# Patient Record
Sex: FEMALE | Race: OTHER
[De-identification: names, ages, dates, MRNs, and addresses within clinical notes are randomized per-mention and may not be internally consistent; named-entity substitution may affect disease eponyms.]

---

## 2020-01-06 ENCOUNTER — TRANSCRIPTION ENCOUNTER (OUTPATIENT)
Age: 50
End: 2020-01-06

## 2020-01-07 PROBLEM — Z00.00 ENCOUNTER FOR PREVENTIVE HEALTH EXAMINATION: Status: ACTIVE | Noted: 2020-01-07

## 2020-01-13 ENCOUNTER — APPOINTMENT (OUTPATIENT)
Dept: ORTHOPEDIC SURGERY | Facility: CLINIC | Age: 50
End: 2020-01-13
Payer: COMMERCIAL

## 2020-01-13 VITALS — HEIGHT: 62 IN | BODY MASS INDEX: 27.23 KG/M2 | WEIGHT: 148 LBS

## 2020-01-13 DIAGNOSIS — M17.11 UNILATERAL PRIMARY OSTEOARTHRITIS, RIGHT KNEE: ICD-10-CM

## 2020-01-13 DIAGNOSIS — Z78.9 OTHER SPECIFIED HEALTH STATUS: ICD-10-CM

## 2020-01-13 PROCEDURE — 99203 OFFICE O/P NEW LOW 30 MIN: CPT

## 2020-01-13 PROCEDURE — 73560 X-RAY EXAM OF KNEE 1 OR 2: CPT | Mod: RT

## 2020-01-13 RX ORDER — NAPROXEN SODIUM 220 MG
TABLET ORAL
Refills: 0 | Status: ACTIVE | COMMUNITY

## 2020-01-13 NOTE — PHYSICAL EXAM
[Normal] : Gait: normal [LE] : Sensory: Intact in bilateral lower extremities [Ankle] : ankle 2+ and symmetric bilaterally [DP] : dorsalis pedis 2+ and symmetric bilaterally [Knee Swelling Right] : swelling [Knee Tenderness On Palpation Right] : tenderness [Knee Motion Left] : full ROM [Knee Motion Right] : limited ROM [Knee Anterior Drawer Sign Right] : negative anterior drawer sign [Knee Swelling Left] : no swelling [Knee Tenderness On Palpation Left] : no tenderness [Knee Anterior Drawer Sign Left] : negative anterior drawer sign [FreeTextEntry2] : Skin intact

## 2020-01-13 NOTE — REASON FOR VISIT
[Initial Visit] : an initial visit for [[employer] ] : provided by [employer]   [FreeTextEntry2] : Right Knee Pain [TWNoteComboBox1] : Citizen of [place]

## 2020-01-13 NOTE — HISTORY OF PRESENT ILLNESS
[de-identified] : Patient reports moderate to severe anterior and medial right knee pain for 3 weeks.  No fall or trauma.  Pain with walking, stairs and at night.  Swelling present.   Patient reports knee feels unstable.  Aleve use with no effect.  Patient was seen at [facility] for xrays on 1/6/20.